# Patient Record
(demographics unavailable — no encounter records)

---

## 2019-01-30 NOTE — DIAGNOSTIC IMAGING REPORT
PROCEDURE: CT chest with contrast only.



TECHNIQUE: Multiple contiguous axial images were obtained through

the chest after administration of intravenous contrast.



INDICATION: Lung nodule.



FINDINGS: The chest exam performed on 01/11/2019 at [facility]

Via Silva raised the question of a nodule overlying the left

upper lung and the breast shadows. The diagnostic mammogram

performed prior to this study failed to show any sign of a solid

mass involving either breast.



On this exam, there is no parenchymal lung mass visualized. I

suspect that the density seen on the chest exam was related to

superimposition.



The lungs are generally clear and well aerated. There is no sign

of failure, pneumonia, or pleural effusion to indicate an acute

abnormality.



The heart size is within normal limits. There are no coronary

artery calcifications evident. The aorta is not abnormally

dilated, and there is no sign of a dissection. There is no defect

within the pulmonary arteries to indicate a pulmonary artery

embolus, although the pulmonary arteries were not fully

opacified.



There is no mediastinal or hilar adenopathy. The thyroid gland

where visualized is unremarkable.



The sections through the upper abdomen show that the liver is

prominent and of lower density than usually seen. This does

suggest fatty metamorphosis. There is no focal mass involving the

liver, and the biliary tree is not abnormally dilated.



The bone windows show no sign of a fracture or of a destructive

lesion.



IMPRESSION:

1. There is no evidence for a parenchymal lung mass. The density

seen on the outside chest exam is felt to be related to

superimposition.

2. There is no acute cardiopulmonary abnormality noted.

3. 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



Dictated by: 



  Dictated on workstation # [id number]

## 2019-01-30 NOTE — DIAGNOSTIC IMAGING REPORT
INDICATION: Breast nodule.



EXAMINATION: Digital mammogram bilateral diagnostic with 3-D

tomosynthesis.



The current study was also evaluated with a Computer Aided

Detection (CAD) system.



There are no previous mammograms available for comparison.



FINDINGS: The patient did undergo a chest exam at [facility] in [place], [place] on 01/11/2019 suggested a

nodule overlying anterior breast parenchyma. There is also a 7 MM

nodule overlying the left upper lung and the anterior left third

rib region. On this exam, there is no primary or secondary sign

of malignancy noted. Specifically, there is no sign of a mass

involving the left breast. Reportedly, CT of the chest is pending

for further study.



There are scattered fibroglandular densities in both breasts

which could obscure a lesion. There is no primary or secondary

sign of malignancy noted.



IMPRESSION:

1. There is no evidence of malignancy.

2. Reportedly, CT of the chest is pending for further study. 



ACR BI-RADS Category 1: Negative.

Result letter will be mailed to the patient.

Note:  At least 10% of breast cancer is not imaged by

mammography.





  Dictated on workstation # [id number]